# Patient Record
Sex: MALE | Race: WHITE | NOT HISPANIC OR LATINO
[De-identification: names, ages, dates, MRNs, and addresses within clinical notes are randomized per-mention and may not be internally consistent; named-entity substitution may affect disease eponyms.]

---

## 2021-07-12 PROBLEM — Z00.00 ENCOUNTER FOR PREVENTIVE HEALTH EXAMINATION: Status: ACTIVE | Noted: 2021-07-12

## 2021-07-29 ENCOUNTER — NON-APPOINTMENT (OUTPATIENT)
Age: 61
End: 2021-07-29

## 2021-07-29 ENCOUNTER — APPOINTMENT (OUTPATIENT)
Dept: GASTROENTEROLOGY | Facility: CLINIC | Age: 61
End: 2021-07-29
Payer: COMMERCIAL

## 2021-07-29 VITALS
HEART RATE: 88 BPM | WEIGHT: 202 LBS | HEIGHT: 71 IN | BODY MASS INDEX: 28.28 KG/M2 | SYSTOLIC BLOOD PRESSURE: 122 MMHG | TEMPERATURE: 97.1 F | DIASTOLIC BLOOD PRESSURE: 82 MMHG

## 2021-07-29 DIAGNOSIS — Z80.0 FAMILY HISTORY OF MALIGNANT NEOPLASM OF DIGESTIVE ORGANS: ICD-10-CM

## 2021-07-29 DIAGNOSIS — K59.00 CONSTIPATION, UNSPECIFIED: ICD-10-CM

## 2021-07-29 PROCEDURE — 99204 OFFICE O/P NEW MOD 45 MIN: CPT

## 2021-07-29 PROCEDURE — 99072 ADDL SUPL MATRL&STAF TM PHE: CPT

## 2021-07-29 NOTE — HISTORY OF PRESENT ILLNESS
[FreeTextEntry1] : 61m THR, TKR x2, complicated diverticulitis '15--> sigmoid resection '16--> recurrent diverticulitis/perforation '17 (Ly) diversion--> reanastomosis '18 (Dr. Villalobos) here for colon screening but also increased gassiness over 6months. Intemittent pain, but poorly localized.  Notes increasing / new hernias in abd wall.  +Constipation, gassiness, heartburn (improves w/ pepcid, TUMS).  No dysphagia.  Poor BM habits, not always following BM urges - busy at work.  No bleeding.  No wt loss.   No melena.  He concedes hx of lifetime of "IBS" with frequent constipation.  Seing hernia surgeon next wk.\par \par Prior testing:\par 3/2018 preop colonoscopy via stoma - polyps - poor visibility from rectal approach\par 4/2018 flex sig preop - limited prep despite multiple enemas - some diversion colitis - rec 1y f/u \par \par Soc:  no tobacco or significant EtOH\par FHx: no FHx GI malignancy or IBD\par \par ROS:\par Constitutional:: no weight loss, fevers\par ENT: no deafness\par Eyes: not blind\par Neck: no LN\par Chest: no dyspnea/cough\par Cardiac: no chest pain\par Vascular: no leg swelling\par GI: no abdominal pain, nausea, vomiting, diarrhea, constipation, rectal bleeding, dysphagia, melena unless otherwise noted in HPI\par : no dysuria, dark urine\par Skin: no rashes, jaundice\par Heme: no bleeding\par Endocrine: no DM unless otherwise stated in HPI\par \par Px: (VS noted below)\par General: NAD\par Eyes: anicteric\par Oropharynx:  clear\par Neck: no LN\par Chest: normal respiratory effort\par CVS: regular\par Abd: soft, Moderate tenderness over hernias - reducible, ND, +BS, no HSM; multiple scars, hernias (incisional, ventral)\par Ext: no atrophy\par Neuro: grossly nonfocal\par \par Labs/imaging/prior endoscopic results reviewed to the extent available and noted in HPI\par

## 2021-07-29 NOTE — REASON FOR VISIT
[Consultation] : a consultation visit [FreeTextEntry1] : Kindly asked by Dr. Leach  to consult and evaluate patient for  hx colon polyps, "stomach issues"                  \par A copy of this note is being sent to physician requesting consultation.

## 2021-07-29 NOTE — CONSULT LETTER
[FreeTextEntry1] : Dear Dr. Leach,\par \par I had the pleasure of evaluating your patient,  GABRIELA THOMPSON.\par \par Please refer to my note below.\par \par Thank you very much for allowing me to participate in the care of this patient.  If you have any questions, please do not hesitate to contact me.\par \par Sincerely, \par \par Amauri Alvares MD\par

## 2021-07-29 NOTE — ASSESSMENT
[FreeTextEntry1] : -abd pain - suspect some functional, but w/ complex hx diverticulitis, now hernias, will get imaging to assess further prior to screening colonoscopy . I suspect constipation main issue - miralax to start.   Gen Surg appt next week already planned.\par \par - GERD - Reviewed dietary and lifestyle modifications, including weight loss, smaller/frequent/earlier (>3h prior to lying down) meals, trials of cutting down/out caffeine, chocolate, tomatoes, fatty foods, alcohol.  Cont current regimen - will plan egd at time of colonoscopy\par \par -hx polyps - will plan colonoscopy once CT scan clarifies that there are no more acute issues.\par \par .l

## 2021-08-05 ENCOUNTER — APPOINTMENT (OUTPATIENT)
Dept: SURGERY | Facility: CLINIC | Age: 61
End: 2021-08-05
Payer: COMMERCIAL

## 2021-08-05 PROCEDURE — 99204 OFFICE O/P NEW MOD 45 MIN: CPT

## 2021-08-05 NOTE — PLAN
[FreeTextEntry1] : He will see Dr Leach prior to surgery and schedule the surgery in September . \par \par

## 2021-08-05 NOTE — ASSESSMENT
[FreeTextEntry1] : ventral hernias (incisional) multiple discussed options an various  repairs, recommend a robotic ventral hernia repair with mesh, possible open. \par The risks benefits and alternatives were discussed and the patient agrees to the described plan.\par

## 2021-08-05 NOTE — HISTORY OF PRESENT ILLNESS
[de-identified] : The patient is referred by Dr Leach for evaluation of enlarging incisional hernias. He is s/p a [place]ri[place]'s procedure  and partial sigmoid resection (separate surgeries) many years ago and now has multiple incisional hernias. He has some increased pain and discomfort.

## 2021-08-05 NOTE — CONSULT LETTER
[Dear  ___] : Dear  [unfilled], [Consult Letter:] : I had the pleasure of evaluating your patient, [unfilled]. [Please see my note below.] : Please see my note below. [FreeTextEntry1] : Juan--He'll see you for PST prior. Thanks!-Long

## 2021-08-05 NOTE — PHYSICAL EXAM
[Calm] : calm [de-identified] : NAD [de-identified] : multiple midline scars and ventral hernia defevts (x 3), all reducible)\par well healed colostomy scar, no hernia

## 2021-08-12 ENCOUNTER — RESULT REVIEW (OUTPATIENT)
Age: 61
End: 2021-08-12

## 2022-01-14 ENCOUNTER — APPOINTMENT (OUTPATIENT)
Dept: GASTROENTEROLOGY | Facility: CLINIC | Age: 62
End: 2022-01-14
Payer: COMMERCIAL

## 2022-01-14 VITALS
WEIGHT: 202 LBS | HEART RATE: 84 BPM | SYSTOLIC BLOOD PRESSURE: 134 MMHG | HEIGHT: 71 IN | BODY MASS INDEX: 28.28 KG/M2 | OXYGEN SATURATION: 98 % | DIASTOLIC BLOOD PRESSURE: 82 MMHG

## 2022-01-14 DIAGNOSIS — Z86.010 PERSONAL HISTORY OF COLONIC POLYPS: ICD-10-CM

## 2022-01-14 DIAGNOSIS — K21.9 GASTRO-ESOPHAGEAL REFLUX DISEASE W/OUT ESOPHAGITIS: ICD-10-CM

## 2022-01-14 DIAGNOSIS — R10.9 UNSPECIFIED ABDOMINAL PAIN: ICD-10-CM

## 2022-01-14 DIAGNOSIS — R93.89 ABNORMAL FINDINGS ON DIAGNOSTIC IMAGING OF OTHER SPECIFIED BODY STRUCTURES: ICD-10-CM

## 2022-01-14 PROCEDURE — 99214 OFFICE O/P EST MOD 30 MIN: CPT

## 2022-01-14 NOTE — HISTORY OF PRESENT ILLNESS
[FreeTextEntry1] : 61m THR, TKR x2, complicated diverticulitis '15--> sigmoid resection '16--> recurrent diverticulitis/perforation '17 ([facility]) diversion--> reanastomosis '18 (Dr. Villalobos) here for f/u.\par \par Pt seen 7/2021 increasing new hernias, constipation, gssiness, heartburn (improved w/ pepcid), no dypshagia.  Also frequent constipation.  Heartburn persists.  Abd pain variable, all quadrants, but mostly over hernia sites when present. \par \par CT 8/2021: Moderate segment small bowel wall thickening, likely infectious versus inflammatory\par enteritis. No bowel obstruction\par \par We ordered MRE but never approved via ins.\par \par Saw Surgery - rec hernia repair when he is ready.\par \par Prior testing:\par 3/2018 preop colonoscopy via stoma - polyps - poor visibility from rectal approach\par 4/2018 flex sig preop - limited prep despite multiple enemas - some diversion colitis - rec 1y f/u \par \par Soc:  no tobacco or significant EtOH\par FHx: no FHx GI malignancy or IBD\par \par ROS:\par Constitutional:: no weight loss, fevers\par ENT: no deafness\par Eyes: not blind\par Neck: no LN\par Chest: no dyspnea/cough\par Cardiac: no chest pain\par Vascular: no leg swelling\par GI: no abdominal pain, nausea, vomiting, diarrhea, constipation, rectal bleeding, dysphagia, melena unless otherwise noted in HPI\par : no dysuria, dark urine\par Skin: no rashes, jaundice\par Heme: no bleeding\par Endocrine: no DM unless otherwise stated in HPI\par \par Px: (VS noted below)\par General: NAD\par Eyes: anicteric\par Oropharynx:  clear\par Neck: no LN\par Chest: normal respiratory effort\par CVS: regular\par Abd: soft, NT.  hernias - reducible, ND, +BS, no HSM; multiple scars, hernias (incisional, ventral)\par Ext: no atrophy\par Neuro: grossly nonfocal\par \par Labs/imaging/prior endoscopic results reviewed to the extent available and noted in HPI\par

## 2022-01-14 NOTE — ASSESSMENT
[FreeTextEntry1] : -abd pain - suspect some related to hernias, will reappeal MRE for CT finding but will also plan EGD/colonoscopy - never had EGD and is due for colonoscopy.  F/U w/ Gen Surg RE: hernias.  Fiber supplements. \par \par - GERD - Reviewed dietary and lifestyle modifications, including weight loss, smaller/frequent/earlier (>3h prior to lying down) meals, trials of cutting down/out caffeine, chocolate, tomatoes, fatty foods, alcohol.  Cont current regimen - will plan egd at time of colonoscopy\par \par -hx polyps - Colonoscopy.\par \par PMD/consultation/hospital notes and Labs/imaging/prior endoscopic results reviewed to extent noted in HPI; and, if procedure code billed on this visit for lab draw, this serves to signify that labs were drawn here in this office.\par

## 2022-01-22 ENCOUNTER — LABORATORY RESULT (OUTPATIENT)
Age: 62
End: 2022-01-22

## 2022-01-31 ENCOUNTER — RESULT REVIEW (OUTPATIENT)
Age: 62
End: 2022-01-31

## 2022-02-02 ENCOUNTER — RESULT REVIEW (OUTPATIENT)
Age: 62
End: 2022-02-02

## 2022-02-03 ENCOUNTER — APPOINTMENT (OUTPATIENT)
Dept: GASTROENTEROLOGY | Facility: HOSPITAL | Age: 62
End: 2022-02-03

## 2022-02-03 RX ORDER — OMEPRAZOLE 40 MG/1
40 CAPSULE, DELAYED RELEASE ORAL
Qty: 30 | Refills: 2 | Status: ACTIVE | COMMUNITY
Start: 2022-02-03 | End: 1900-01-01

## 2022-11-01 ENCOUNTER — APPOINTMENT (OUTPATIENT)
Dept: SURGERY | Facility: CLINIC | Age: 62
End: 2022-11-01

## 2022-11-01 VITALS — SYSTOLIC BLOOD PRESSURE: 168 MMHG | HEART RATE: 72 BPM | TEMPERATURE: 98.9 F | DIASTOLIC BLOOD PRESSURE: 84 MMHG

## 2022-11-01 DIAGNOSIS — K43.9 VENTRAL HERNIA W/OUT OBSTRUCTION OR GANGRENE: ICD-10-CM

## 2022-11-01 PROCEDURE — 99213 OFFICE O/P EST LOW 20 MIN: CPT

## 2022-11-01 NOTE — PHYSICAL EXAM
[No Rash or Lesion] : No rash or lesion [Alert] : alert [Oriented to Person] : oriented to person [Oriented to Place] : oriented to place [Oriented to Time] : oriented to time [Calm] : calm [de-identified] : NAD [de-identified] : ventral hernia

## 2022-11-01 NOTE — HISTORY OF PRESENT ILLNESS
[de-identified] : Pt to discuss further plans of a ventral hernia repair with mesh (robotically, possible open). He is now ready to schedule the surgery. Full discussion.

## 2022-11-01 NOTE — ASSESSMENT
[FreeTextEntry1] : options re-discussed, robotic ventral hernia repair with mesh, possible open recommended, The risks benefits and alternatives were discussed and the patient agrees to the described plan.\par

## 2022-11-25 ENCOUNTER — APPOINTMENT (OUTPATIENT)
Dept: SURGERY | Facility: HOSPITAL | Age: 62
End: 2022-11-25

## 2022-11-25 ENCOUNTER — RESULT REVIEW (OUTPATIENT)
Age: 62
End: 2022-11-25

## 2023-10-12 ENCOUNTER — APPOINTMENT (OUTPATIENT)
Dept: GASTROENTEROLOGY | Facility: CLINIC | Age: 63
End: 2023-10-12